# Patient Record
Sex: FEMALE | Race: WHITE | Employment: UNEMPLOYED | URBAN - METROPOLITAN AREA
[De-identification: names, ages, dates, MRNs, and addresses within clinical notes are randomized per-mention and may not be internally consistent; named-entity substitution may affect disease eponyms.]

---

## 2020-02-29 ENCOUNTER — APPOINTMENT (OUTPATIENT)
Dept: ULTRASOUND IMAGING | Age: 30
End: 2020-02-29
Payer: MEDICAID

## 2020-02-29 ENCOUNTER — HOSPITAL ENCOUNTER (EMERGENCY)
Age: 30
Discharge: HOME | End: 2020-03-01
Attending: EMERGENCY MEDICINE
Payer: MEDICAID

## 2020-02-29 LAB
A/G RATIO: 1.2 (ref 1.1–2.2)
ALBUMIN SERPL-MCNC: 4.1 G/DL (ref 3.4–5)
ALP BLD-CCNC: 75 U/L (ref 40–129)
ALT SERPL-CCNC: 8 U/L (ref 10–40)
ANION GAP SERPL CALCULATED.3IONS-SCNC: 13 MMOL/L (ref 3–16)
AST SERPL-CCNC: 15 U/L (ref 15–37)
BACTERIA: ABNORMAL /HPF
BASOPHILS ABSOLUTE: 0 K/UL (ref 0–0.2)
BASOPHILS RELATIVE PERCENT: 0.5 %
BILIRUB SERPL-MCNC: <0.2 MG/DL (ref 0–1)
BILIRUBIN URINE: NEGATIVE
BLOOD, URINE: ABNORMAL
BUN BLDV-MCNC: 7 MG/DL (ref 7–20)
CALCIUM SERPL-MCNC: 9.1 MG/DL (ref 8.3–10.6)
CHLORIDE BLD-SCNC: 99 MMOL/L (ref 99–110)
CLARITY: ABNORMAL
CO2: 22 MMOL/L (ref 21–32)
COLOR: YELLOW
CREAT SERPL-MCNC: <0.5 MG/DL (ref 0.6–1.1)
EOSINOPHILS ABSOLUTE: 0 K/UL (ref 0–0.6)
EOSINOPHILS RELATIVE PERCENT: 0.5 %
EPITHELIAL CELLS, UA: ABNORMAL /HPF (ref 0–5)
GFR AFRICAN AMERICAN: >60
GFR NON-AFRICAN AMERICAN: >60
GLOBULIN: 3.4 G/DL
GLUCOSE BLD-MCNC: 98 MG/DL (ref 70–99)
GLUCOSE URINE: NEGATIVE MG/DL
GONADOTROPIN, CHORIONIC (HCG) QUANT: NORMAL MIU/ML
HCG QUALITATIVE: POSITIVE
HCT VFR BLD CALC: 38 % (ref 36–48)
HEMOGLOBIN: 13 G/DL (ref 12–16)
KETONES, URINE: ABNORMAL MG/DL
LEUKOCYTE ESTERASE, URINE: NEGATIVE
LIPASE: 18 U/L (ref 13–60)
LYMPHOCYTES ABSOLUTE: 1.4 K/UL (ref 1–5.1)
LYMPHOCYTES RELATIVE PERCENT: 15.6 %
MCH RBC QN AUTO: 29 PG (ref 26–34)
MCHC RBC AUTO-ENTMCNC: 34.3 G/DL (ref 31–36)
MCV RBC AUTO: 84.7 FL (ref 80–100)
MICROSCOPIC EXAMINATION: YES
MONOCYTES ABSOLUTE: 0.4 K/UL (ref 0–1.3)
MONOCYTES RELATIVE PERCENT: 4.6 %
MUCUS: ABNORMAL /LPF
NEUTROPHILS ABSOLUTE: 7.2 K/UL (ref 1.7–7.7)
NEUTROPHILS RELATIVE PERCENT: 78.8 %
NITRITE, URINE: NEGATIVE
PDW BLD-RTO: 15.4 % (ref 12.4–15.4)
PH UA: 6 (ref 5–8)
PLATELET # BLD: 266 K/UL (ref 135–450)
PMV BLD AUTO: 8.9 FL (ref 5–10.5)
POTASSIUM SERPL-SCNC: 3.6 MMOL/L (ref 3.5–5.1)
PROTEIN UA: NEGATIVE MG/DL
RBC # BLD: 4.48 M/UL (ref 4–5.2)
RBC UA: ABNORMAL /HPF (ref 0–4)
SODIUM BLD-SCNC: 134 MMOL/L (ref 136–145)
SPECIFIC GRAVITY UA: >=1.03 (ref 1–1.03)
TOTAL PROTEIN: 7.5 G/DL (ref 6.4–8.2)
TROPONIN: <0.01 NG/ML
URINE TYPE: ABNORMAL
UROBILINOGEN, URINE: 0.2 E.U./DL
WBC # BLD: 9.1 K/UL (ref 4–11)
WBC UA: ABNORMAL /HPF (ref 0–5)

## 2020-02-29 PROCEDURE — 87086 URINE CULTURE/COLONY COUNT: CPT

## 2020-02-29 PROCEDURE — 84703 CHORIONIC GONADOTROPIN ASSAY: CPT

## 2020-02-29 PROCEDURE — 84484 ASSAY OF TROPONIN QUANT: CPT

## 2020-02-29 PROCEDURE — 84702 CHORIONIC GONADOTROPIN TEST: CPT

## 2020-02-29 PROCEDURE — 76705 ECHO EXAM OF ABDOMEN: CPT

## 2020-02-29 PROCEDURE — 83690 ASSAY OF LIPASE: CPT

## 2020-02-29 PROCEDURE — 80053 COMPREHEN METABOLIC PANEL: CPT

## 2020-02-29 PROCEDURE — 6370000000 HC RX 637 (ALT 250 FOR IP): Performed by: NURSE PRACTITIONER

## 2020-02-29 PROCEDURE — 85025 COMPLETE CBC W/AUTO DIFF WBC: CPT

## 2020-02-29 PROCEDURE — 81001 URINALYSIS AUTO W/SCOPE: CPT

## 2020-02-29 PROCEDURE — 99284 EMERGENCY DEPT VISIT MOD MDM: CPT

## 2020-02-29 RX ORDER — FAMOTIDINE 20 MG/1
20 TABLET, FILM COATED ORAL ONCE
Status: COMPLETED | OUTPATIENT
Start: 2020-02-29 | End: 2020-02-29

## 2020-02-29 RX ORDER — ACETAMINOPHEN 325 MG/1
650 TABLET ORAL ONCE
Status: COMPLETED | OUTPATIENT
Start: 2020-02-29 | End: 2020-02-29

## 2020-02-29 RX ADMIN — FAMOTIDINE 20 MG: 20 TABLET, FILM COATED ORAL at 21:22

## 2020-02-29 RX ADMIN — ACETAMINOPHEN 650 MG: 325 TABLET ORAL at 21:22

## 2020-02-29 ASSESSMENT — PAIN DESCRIPTION - LOCATION
LOCATION: ABDOMEN
LOCATION: ABDOMEN

## 2020-02-29 ASSESSMENT — PAIN SCALES - GENERAL
PAINLEVEL_OUTOF10: 9

## 2020-02-29 ASSESSMENT — PAIN DESCRIPTION - FREQUENCY: FREQUENCY: CONTINUOUS

## 2020-02-29 ASSESSMENT — PAIN DESCRIPTION - DESCRIPTORS: DESCRIPTORS: CRAMPING

## 2020-03-01 ENCOUNTER — APPOINTMENT (OUTPATIENT)
Dept: ULTRASOUND IMAGING | Age: 30
End: 2020-03-01
Payer: MEDICAID

## 2020-03-01 VITALS
HEART RATE: 86 BPM | SYSTOLIC BLOOD PRESSURE: 140 MMHG | HEIGHT: 63 IN | RESPIRATION RATE: 18 BRPM | TEMPERATURE: 98.7 F | WEIGHT: 244 LBS | OXYGEN SATURATION: 100 % | BODY MASS INDEX: 43.23 KG/M2 | DIASTOLIC BLOOD PRESSURE: 86 MMHG

## 2020-03-01 PROCEDURE — 76805 OB US >/= 14 WKS SNGL FETUS: CPT

## 2020-03-01 NOTE — ED PROVIDER NOTES
encounter of 02/29/20   CBC Auto Differential   Result Value Ref Range    WBC 9.1 4.0 - 11.0 K/uL    RBC 4.48 4.00 - 5.20 M/uL    Hemoglobin 13.0 12.0 - 16.0 g/dL    Hematocrit 38.0 36.0 - 48.0 %    MCV 84.7 80.0 - 100.0 fL    MCH 29.0 26.0 - 34.0 pg    MCHC 34.3 31.0 - 36.0 g/dL    RDW 15.4 12.4 - 15.4 %    Platelets 758 021 - 938 K/uL    MPV 8.9 5.0 - 10.5 fL    Neutrophils % 78.8 %    Lymphocytes % 15.6 %    Monocytes % 4.6 %    Eosinophils % 0.5 %    Basophils % 0.5 %    Neutrophils Absolute 7.2 1.7 - 7.7 K/uL    Lymphocytes Absolute 1.4 1.0 - 5.1 K/uL    Monocytes Absolute 0.4 0.0 - 1.3 K/uL    Eosinophils Absolute 0.0 0.0 - 0.6 K/uL    Basophils Absolute 0.0 0.0 - 0.2 K/uL   Comprehensive Metabolic Panel   Result Value Ref Range    Sodium 134 (L) 136 - 145 mmol/L    Potassium 3.6 3.5 - 5.1 mmol/L    Chloride 99 99 - 110 mmol/L    CO2 22 21 - 32 mmol/L    Anion Gap 13 3 - 16    Glucose 98 70 - 99 mg/dL    BUN 7 7 - 20 mg/dL    CREATININE <0.5 (L) 0.6 - 1.1 mg/dL    GFR Non-African American >60 >60    GFR African American >60 >60    Calcium 9.1 8.3 - 10.6 mg/dL    Total Protein 7.5 6.4 - 8.2 g/dL    Alb 4.1 3.4 - 5.0 g/dL    Albumin/Globulin Ratio 1.2 1.1 - 2.2    Total Bilirubin <0.2 0.0 - 1.0 mg/dL    Alkaline Phosphatase 75 40 - 129 U/L    ALT 8 (L) 10 - 40 U/L    AST 15 15 - 37 U/L    Globulin 3.4 g/dL   HCG Qualitative, Serum   Result Value Ref Range    hCG Qual POSITIVE Detects HCG level >10 MIU/mL   Troponin   Result Value Ref Range    Troponin <0.01 <0.01 ng/mL   Urinalysis   Result Value Ref Range    Color, UA Yellow Straw/Yellow    Clarity, UA SL CLOUDY (A) Clear    Glucose, Ur Negative Negative mg/dL    Bilirubin Urine Negative Negative    Ketones, Urine TRACE (A) Negative mg/dL    Specific Gravity, UA >=1.030 1.005 - 1.030    Blood, Urine TRACE-INTACT (A) Negative    pH, UA 6.0 5.0 - 8.0    Protein, UA Negative Negative mg/dL    Urobilinogen, Urine 0.2 <2.0 E.U./dL    Nitrite, Urine Negative Negative Leukocyte Esterase, Urine Negative Negative    Microscopic Examination YES     Urine Type NotGiven    Microscopic Urinalysis   Result Value Ref Range    Mucus, UA 1+ (A) None Seen /LPF    WBC, UA 6-10 (A) 0 - 5 /HPF    RBC, UA 11-20 (A) 0 - 4 /HPF    Epithelial Cells, UA 6-10 (A) 0 - 5 /HPF    Bacteria, UA 1+ (A) None Seen /HPF   Lipase   Result Value Ref Range    Lipase 18.0 13.0 - 60.0 U/L   HCG, Quantitative, Pregnancy   Result Value Ref Range    hCG Quant 33089.0 <5.0 mIU/mL       I estimate there is LOW risk for ACUTE APPENDICITIS, BOWEL OBSTRUCTION, CHOLECYSTITIS, DIVERTICULITIS, INCARCERATED HERNIA, PANCREATITIS, PELVIC INFLAMMATORY DISEASE, PERFORATED BOWEL or ULCER, or TUBO-OVARIAN ABSCESS, thus I consider the discharge disposition reasonable. Also, there is no evidence or peritonitis, sepsis, or toxicity. Louise Route and I have discussed the diagnosis and risks, and we agree with discharging home to follow-up with their primary doctor. We also discussed returning to the Emergency Department immediately if new or worsening symptoms occur. We have discussed the symptoms which are most concerning (e.g., bloody stool, fever, changing or worsening pain, vomiting) that necessitate immediate return. Final Impression    1. Gallbladder sludge    2. Abdominal pain during pregnancy in second trimester        Blood pressure (!) 140/86, pulse 86, temperature 98.7 °F (37.1 °C), temperature source Oral, resp. rate 18, height 5' 3\" (1.6 m), weight 244 lb (110.7 kg), SpO2 100 %. mdm    Patient was sent home with a prescription for below medication/s. I did [place] patient on appropriate use of these medication.   Discharge Medication List as of 3/1/2020  1:03 AM              FOLLOW UP  follow up with obgyn    Schedule an appointment as soon as possible for a visit   follow up    [facility]  ED  [address] [facility] [address]  Go to   As needed, If symptoms worsen    Kizzy Palomino Dwight Saxena MD  [address] [facility], [address] [place] [address]  [phone]    Schedule an appointment as soon as possible for a visit   follow up for gallbladder disease      DISPOSITION  Patient was discharged to home in good condition. Comment: Please note this report has been produced using speech recognition software and may contain errors related to that system including errors in grammar, punctuation, and spelling, as well as words and phrases that may be inappropriate. If there are any questions or concerns please feel free to contact the dictating provider for clarification.             JOELLE Clark - CNP  03/01/20 [id number]

## 2020-03-02 LAB — URINE CULTURE, ROUTINE: NORMAL

## 2021-08-06 ENCOUNTER — HOSPITAL ENCOUNTER (OUTPATIENT)
Dept: GENERAL RADIOLOGY | Age: 31
Discharge: HOME | End: 2021-08-06
Payer: MEDICAID

## 2021-08-06 DIAGNOSIS — M79.672 LEFT FOOT PAIN: ICD-10-CM

## 2021-08-06 PROCEDURE — 73630 X-RAY EXAM OF FOOT: CPT

## 2021-12-21 ENCOUNTER — OFFICE VISIT (OUTPATIENT)
Dept: ORTHOPEDIC SURGERY | Age: 31
End: 2021-12-21
Payer: MEDICAID

## 2021-12-21 VITALS — BODY MASS INDEX: 43.23 KG/M2 | WEIGHT: 244 LBS | HEIGHT: 63 IN

## 2021-12-21 DIAGNOSIS — M79.672 BILATERAL FOOT PAIN: ICD-10-CM

## 2021-12-21 DIAGNOSIS — M79.671 BILATERAL FOOT PAIN: ICD-10-CM

## 2021-12-21 DIAGNOSIS — S92.812S CLOSED FRACTURE OF SESAMOID BONE OF LEFT FOOT, SEQUELA: ICD-10-CM

## 2021-12-21 DIAGNOSIS — M72.2 PLANTAR FASCIITIS, RIGHT: Primary | ICD-10-CM

## 2021-12-21 PROCEDURE — G8427 DOCREV CUR MEDS BY ELIG CLIN: HCPCS | Performed by: ORTHOPAEDIC SURGERY

## 2021-12-21 PROCEDURE — 99244 OFF/OP CNSLTJ NEW/EST MOD 40: CPT | Performed by: ORTHOPAEDIC SURGERY

## 2021-12-21 PROCEDURE — G8417 CALC BMI ABV UP PARAM F/U: HCPCS | Performed by: ORTHOPAEDIC SURGERY

## 2021-12-21 PROCEDURE — L3040 FT ARCH SUPRT PREMOLD LONGIT: HCPCS | Performed by: ORTHOPAEDIC SURGERY

## 2021-12-21 PROCEDURE — G8484 FLU IMMUNIZE NO ADMIN: HCPCS | Performed by: ORTHOPAEDIC SURGERY

## 2021-12-21 NOTE — PATIENT INSTRUCTIONS
ACHILLES TENDON STRETCHING PROGRAM       Hello and welcome to the most important stretch that you can do for your lower legs and feet. The achilles tendon is 9 times stronger than any other lower leg tendon. When this tendon gets tight, it causes a cascade of gait changes that can cause injury to other tendons and joints in the foot. Our stretching program is aimed at treating the primary problem, achilles tendon tightness. In turn, the problems that you are experiencing, which are being caused or exacerbated by the tightness, will improve and resolve in time. Stretching your achilles tendon is the best maintenance you can do for your foot. Achilles tendon tightness develops gradually in all of us as time goes by. Some people have a genetic propensity to get tighter more quickly and at a younger age. Others may perform activities or sports that accelerate the tightness. The exact cause is not critical.  What is critical is diagnosing the tightness and treating it. Untreated achilles tendon tightness can lead to a host of problems including:  plantar fasciitis, achilles tendinitis, midfoot arthritis, metatarsalgia, and hammertoes to name just a few. The stretching program outlined below  has been validated hundreds and hundreds of times over the last 17 years of our practice. Try to follow the stretching protocol as closely as you can. It is designed to gradually improve your flexibility safely and comfortably. Some patients may require as little as 3 weeks to see improvement, while others may need upwards of 5-6 months to break through a long standing Achilles contracture. This stretching should be done 3 times a day and divided roughly throughout the day. You should begin the stretch at 15 seconds three times daily and gradually increase your stretching as explained later in the handout. Within 2 months you will be stretching 9 minutes a day.       The stretch can be performed on the edge of a stool or steps, or by using a tilt board or similar device. It is OK for your toes to angle upward; you should not be gripping the step with your toes. If you are in the correct position you should feel a pulling or tightness in your upper calf muscle, just below the knee. Be sure to time your stretching:  time never moves more slowly than when stretching your calves! The majority of patients will experience new pain somewhere between 2-6 weeks after beginning the stretching program.  This is definitely expected and is usually a minor increase in pain and should not be excruciating. If it becomes severe, please contact the office. If you continue the stretching program and work through this pain, it typically begins to resolve within a few weeks. As you progress with the stretching program, your response will have an up-and-down course (you will have some good days and bad days)--this is expected and normal.  While not all patients symptoms are relieved completely, usually a satisfactory result is obtained within three to six months. You have to BE PATIENT. You will not likely see improvement in the first few weeks and may not see any at all until the 3rd month. Eventually your pain will ease. Remember,  this is a maintenance stretch, not a vaccination:  you will have to keep stretching for the rest of your life. Our Stretching Protocol  How long and how often should I stretch my calves?  Find your starting point based on your age, fitness level and health status. (See scale below.) Use the numbers (1-4) on the device as points of reference, with 1 being the least intense, and when the device is turned around, 4 providing the most intense stretch. There is not a set position to start stretching, as all people are at different fitness and flexibility levels. Once you have found a position that provides a good stretch, you can work from there.  The optimal stretching time is ultimately 3 minutes 3 times per day, EVERY DAY.  Do the stretching in one session or cluster. Think of it as doing three sets, and each time you are on the One Stretch being the repetitions in that particular set. After each set (15 sec., 3 min. , etc.) is completed, take a brief rest (brush your teeth, please), and then on to the next set. Complete all three sets and you are finished for the day. There is no benefit to spreading these exercises throughout the day, even though this would seem logical. However this method makes it easy to forget and quit.  A light to moderate stretch that you feel in your calf is as good as a hard intense stretch. Be Patient!  You can decide for yourself the length and number of sets you want to do, but this is our recommended stretching protocol. Suggested Starting Points and Weekly Progression  Most people start right out at 3 minutes 3 time per day. However, we recommend considering a more conservative, safer start using shorter times and building up especially considering your age, health status, and current state of fitness as shown in the graphic below. Of course if you find the start too slow you can advance as tolerated. The Finer Points: More Detail  Calf stretching is very important for a wide variety of people whether you have a problem or not. And there is absolutely no better or safer way to do calf stretching than on the One Stretch. The ultimate goal would be for us all to stretch our calves everyday and prevent the majority of foot and ankle problems before they occur. Now that would be a novel concept, wouldnt it. There are a whole variety of treatments for the problems that tight calves may cause, a few of which you may have already tried. These would include physical therapy, orthotics, rest, immobilization, injections, and non-steroidal anti-inflammatory medicines.  While these treatments may help the symptoms, none correct the actual cause, your calf that is too tight, and therefore, are usually not long-lasting. First of all, you must know that the primary cause of the majority of foot and ankle problems is due to our calves that have become too tight as we age. Usually the calf contracture is silent, so you are not aware. The calf muscle and Achilles tendon are a continuous structure on the back of the leg, which in turn causes increased stress on most areas of your foot and ankle when the calf is too tight. As people age, tightness (contracture) is almost inevitable. There are several reasons this occurs such as:  1. Decreased daily activities. As we become more sedentary, we have less daily stretch of the calf muscles. 2. Age-related decrease in the elasticity of the calf connective tissue. 3. Higher heeled shoes will put the calf in a shortened position and it tends to stay there. 4. Athletes/runners calves shorten for reasons outside the scope of this handout. Our treatment is aimed at solving the primary problem of calf contractures with simple static calf stretching. In turn, your problem, which is due to or aggravated by calf contractures, will improve or resolve completely. Simple calf stretching is the treatment of choice and will generally obtain satisfactory to complete relief in better than 95% of the patients. Some patients may require as little as 2 weeks to see improvement while others may need upwards of 5-6 months to break through a long standing calf contracture. In the beginning the amount you stretch will vary due to pain or soreness of the calf muscles, heels, or other problem we are treating. This stretch will be done with both feet, therefore you may experience increased new pain in both heels, but this new pain will resolve as well. This stretching should be done three times a day; building up to three minutes of hang time for each session or set as detailed on the chart above.  For the best consistency do the stretching sessions like sets or in a cluster; do your first stretch session and take a break for 1-2 minutes (e.g., take your shower) then the next stretch session and so on. That way you are done for the day and you are less likely to miss. With your back against the wall and your knees straight, place the arch of your feet on the One Stretch (it is best to wear tennis shoes or rubber soled shoes in order to gain better traction to secure your feet) and slowly relax your ankles, letting your heels go downward. If you are in the correct position you should feel a pulling or tightness in your upper calf muscle, below the knee. It is OK to feel some mild pain. The amount of time you start at is dependent on your age, fitness, and any current health issues (see chart above). For instance, if you are young, fit, and healthy you might start at 3 minutes right away. Of course it is always best to use caution when choosing your starting point. Do the three sessions per day gradually increasing the amount of hang time. At times you may need to stay at the same amount of hang time for a few extra days. As you become accustomed to that particular amount of hang time you will need to increase it gradually in the same manner until you reach the maximum of 3 minutes each session. This is a gradual process and although you may want to get right to three minutes of hang time it may not be advisable to do so as you may cause yourself unnecessary pain. Also, keep the intensity of your stretch reasonable. A light to moderate stretch that you feel in your calf is as good as a hard intense stretch. The length of time you stretch has been found to be much more important than the intensity of the stretch. Be Patient! Dont over do it.   The majority of patients will experience new or a slight increase in pain somewhere between 2-6 weeks after beginning the stretching program. This is definitely expected and is usually only minor increase in pain and should not be excruciating. If it becomes severe, please consult your physician. If you continue the stretching program and work through this pain, it typically begins to resolve within a few weeks. As you progress with the stretching program, your response will have an up-and-down course (you will have some good days and bad days)--this is expected and normal. While not every patients symptoms are relieved completely, usually a satisfactory result is obtained within one to four months. Once you have obtained satisfactory relief, we recommend that the stretching is continued FOREVER. ACHILLES TENDON STRETCHING PROGRAM      Week I - 15 seconds 3 times/day Week II - -30 seconds 3 times/day  Week III - -1 minute 3 times/day  Week IV - -1 ½ minutes 3 times/day  Week V - -2 minutes 3 times/day  Week VI - -2 ½ minutes 3 times/day  Week VII 3 minutes 3 times/day   for life, never stop                         Then maintenance of 3 minutes 1-2 times/day for life      Patient Education        Plantar Fasciitis: Care Instructions  Overview     Plantar fasciitis is pain and inflammation of the plantar fascia, the tissue at the bottom of your foot that connects the heel bone to the toes. The plantar fascia also supports the arch. If you strain the plantar fascia, it can develop small tears and cause heel pain when you stand or walk. Plantar fasciitis can be caused by running or other sports. It also may occur in people who are overweight or who have high arches or flat feet. You may get plantar fasciitis if you walk or stand for long periods, or have a tight Achilles tendon or calf muscles. You can improve your foot pain with rest and other care at home. It might take a few weeks to a few months for your foot to heal completely. Follow-up care is a key part of your treatment and safety. Be sure to make and go to all appointments, and call your doctor if you are having problems.  It's also a good idea to know your test results and keep a list of the medicines you take. How can you care for yourself at home? · Rest your feet often. Reduce your activity to a level that lets you avoid pain. If possible, do not run or walk on hard surfaces. · Take pain medicines exactly as directed. ? If the doctor gave you a prescription medicine for pain, take it as prescribed. ? If you are not taking a prescription pain medicine, take an over-the-counter anti-inflammatory medicine for pain and swelling, such as ibuprofen (Advil, Motrin) or naproxen (Aleve). Read and follow all instructions on the label. · Use ice massage to help with pain and swelling. You can use an ice cube or an ice cup several times a day. To make an ice cup, fill a paper cup with water and freeze it. Cut off the top of the cup until a half-inch of ice shows. Hold onto the remaining paper to use the cup. Rub the ice in small circles over the area for 5 to 7 minutes. · Contrast baths, which alternate hot and cold water, can also help reduce swelling. But because heat alone may make pain and swelling worse, end a contrast bath with a soak in cold water. · Wear a night splint if your doctor suggests it. A night splint holds your foot with the toes pointed up and the foot and ankle at a 90-degree angle. This position gives the bottom of your foot a constant, gentle stretch. · Do simple exercises such as calf stretches and towel stretches 2 to 3 times each day, especially when you first get up in the morning. These can help the plantar fascia become more flexible. They also make the muscles that support your arch stronger. Hold these stretches for 15 to 30 seconds per stretch. Repeat 2 to 4 times. ? Stand about 1 foot from a wall. Place the palms of both hands against the wall at chest level.  Lean forward against the wall, keeping one leg with the knee straight and heel on the ground while bending the knee of the other leg.  ? Sit down on the floor or a mat with your feet stretched in front of you. Roll up a towel lengthwise, and loop it over the ball of your foot. Holding the towel at both ends, gently pull the towel toward you to stretch your foot. · Wear shoes with good arch support. Athletic shoes or shoes with a well-cushioned sole are good choices. · Replace athletic shoes regularly. · Try heel cups or shoe inserts (orthotics) to help cushion your heel. You can buy these at many shoe stores. · Put on your shoes as soon as you get out of bed. Going barefoot or wearing slippers may make your pain worse. · Reach and stay at a good weight for your height. This puts less strain on your feet. When should you call for help? Call your doctor now or seek immediate medical care if:    · You have heel pain with fever, redness, or warmth in your heel.     · You cannot put weight on the sore foot. Watch closely for changes in your health, and be sure to contact your doctor if:    · You have numbness or tingling in your heel.     · Your heel pain lasts more than 2 weeks. Where can you learn more? Go to https://[employer]. org and sign in to your [employer] account. Enter W006 in the [employer] box to learn more about \"Plantar Fasciitis: Care Instructions. \"     If you do not have an account, please click on the \"Sign Up Now\" link. Current as of: July 1, 2021               Content Version: 13.0  © 2006-2021 [employer]. Care instructions adapted under license by [facility] ([facility]). If you have questions about a medical condition or this instruction, always ask your healthcare professional. [place] [address] any warranty or liability for your use of this information.